# Patient Record
Sex: MALE | Race: BLACK OR AFRICAN AMERICAN | NOT HISPANIC OR LATINO | Employment: FULL TIME | URBAN - METROPOLITAN AREA
[De-identification: names, ages, dates, MRNs, and addresses within clinical notes are randomized per-mention and may not be internally consistent; named-entity substitution may affect disease eponyms.]

---

## 2018-11-27 ENCOUNTER — ALLIED HEALTH/NURSE VISIT (OUTPATIENT)
Dept: NURSING | Facility: CLINIC | Age: 38
End: 2018-11-27

## 2018-11-27 VITALS
SYSTOLIC BLOOD PRESSURE: 105 MMHG | TEMPERATURE: 97.7 F | DIASTOLIC BLOOD PRESSURE: 68 MMHG | WEIGHT: 168.6 LBS | HEART RATE: 82 BPM

## 2018-11-27 DIAGNOSIS — R10.11 RUQ ABDOMINAL PAIN: Primary | ICD-10-CM

## 2018-11-27 ASSESSMENT — PAIN SCALES - GENERAL: PAINLEVEL: WORST PAIN (10)

## 2018-11-27 NOTE — MR AVS SNAPSHOT
"              After Visit Summary   2018    Abel Ashton    MRN: [id number]           Patient Information     Date Of Birth          1980        Visit Information        Provider Department      2018 9:30 AM BK RN [facility]        Today's Diagnoses     RUQ abdominal pain    -  1       Follow-ups after your visit        Who to contact     If you have questions or need follow up information about today's clinic visit or your schedule please contact [facility] directly at [phone].  Normal or non-critical lab and imaging results will be communicated to you by [employer], letter or phone within 4 business days after the clinic has received the results. If you do not hear from us within 7 days, please contact the clinic through [employer] or phone. If you have a critical or abnormal lab result, we will notify you by phone as soon as possible.  Submit refill requests through [employer] or call your pharmacy and they will forward the refill request to us. Please allow 3 business days for your refill to be completed.          Additional Information About Your Visit        MyChart Information     De[employer] lets you send messages to your doctor, view your test results, renew your prescriptions, schedule appointments and more. To sign up, go to [email or web address] . Click on \"Log in\" on the left side of the screen, which will take you to the Welcome page. Then click on \"Sign up Now\" on the right side of the page.     You will be asked to enter the access code listed below, as well as some personal information. Please follow the directions to create your username and password.     Your access code is: [email or web address]  Expires: 2019 10:29 AM     Your access code will  in 90 days. If you need help or a new code, please call your [facility] or [phone].        Care EveryWhere ID     This is your Care EveryWhere ID. This could be used by other " organizations to access your [facility] medical records  [id number]        Your Vitals Were     Pulse Temperature                82 97.7  F (36.5  C) (Oral)           Blood Pressure from Last 3 Encounters:   11/27/18 105/68    Weight from Last 3 Encounters:   11/27/18 168 lb 9.6 oz (76.5 kg)              Today, you had the following     No orders found for display       Primary Care Provider Fax #    Provider Not In System [phone]                Equal Access to Services     MARRY KWAN : Hadii aad ku hadasho Soomaali, waaxda luqadaha, qaybta kaalmada adeegyada, waxay idiin hayaan adeeg gene menjivar . So [facility] [phone].    ATENCIÓN: Si habla español, tiene a moore disposición servicios gratuitos de asistencia lingüística. Llame al [phone].    We comply with applicable federal civil rights laws and [place] laws. We do not discriminate on the basis of race, color, national origin, age, disability, sex, sexual orientation, or gender identity.            Thank you!     Thank you for choosing [facility]  for your care. Our goal is always to provide you with excellent care. Hearing back from our patients is one way we can continue to improve our services. Please take a few minutes to complete the written survey that you may receive in the mail after your visit with us. Thank you!             Your Updated Medication List - Protect others around you: Learn how to safely use, store and throw away your medicines at www.disposemymeds.org.      Notice  As of 11/27/2018 10:29 AM    You have not been prescribed any medications.

## 2018-11-27 NOTE — NURSING NOTE
RN Triage:     Chief Complaint   Patient presents with     Abdominal Pain     RUQ pain, started today, 10/10, no vomiting or diarrhea, no fever       Initial /68 (BP Location: Left arm, Patient Position: Chair, Cuff Size: Adult Regular)  Pulse 82  Temp 97.7  F (36.5  C) (Oral)  Wt 168 lb 9.6 oz (76.5 kg) There is no height or weight on file to calculate BMI.  BP completed using cuff size: regular    Assessment:  Vitals stable, informed patient that he should be seen in the ER due to the possibility of needing testing. Asked if someone was with to drive the patient to the ER. Patient noted that he would call someone. Then patient stated he would drive himself.    Triage Dispo: ER for assessment    Intervention: none    Jihan Chang RN

## 2023-05-13 ENCOUNTER — OFFICE VISIT (OUTPATIENT)
Dept: URGENT CARE | Facility: URGENT CARE | Age: 43
End: 2023-05-13
Payer: COMMERCIAL

## 2023-05-13 VITALS
RESPIRATION RATE: 16 BRPM | HEART RATE: 69 BPM | WEIGHT: 164 LBS | OXYGEN SATURATION: 99 % | SYSTOLIC BLOOD PRESSURE: 107 MMHG | DIASTOLIC BLOOD PRESSURE: 73 MMHG

## 2023-05-13 DIAGNOSIS — K64.4 RESIDUAL HEMORRHOIDAL SKIN TAGS: ICD-10-CM

## 2023-05-13 DIAGNOSIS — N34.2 URETHRITIS: Primary | ICD-10-CM

## 2023-05-13 LAB
ALBUMIN UR-MCNC: NEGATIVE MG/DL
APPEARANCE UR: ABNORMAL
BILIRUB UR QL STRIP: NEGATIVE
COLOR UR AUTO: YELLOW
GLUCOSE UR STRIP-MCNC: NEGATIVE MG/DL
HGB UR QL STRIP: ABNORMAL
KETONES UR STRIP-MCNC: NEGATIVE MG/DL
LEUKOCYTE ESTERASE UR QL STRIP: ABNORMAL
NITRATE UR QL: NEGATIVE
PH UR STRIP: 6 [PH] (ref 5–7)
RBC #/AREA URNS AUTO: ABNORMAL /HPF
SP GR UR STRIP: <=1.005 (ref 1–1.03)
SQUAMOUS #/AREA URNS AUTO: ABNORMAL /LPF
UROBILINOGEN UR STRIP-ACNC: 0.2 E.U./DL
WBC #/AREA URNS AUTO: ABNORMAL /HPF

## 2023-05-13 PROCEDURE — 96372 THER/PROPH/DIAG INJ SC/IM: CPT | Performed by: STUDENT IN AN ORGANIZED HEALTH CARE EDUCATION/TRAINING PROGRAM

## 2023-05-13 PROCEDURE — 81001 URINALYSIS AUTO W/SCOPE: CPT | Performed by: STUDENT IN AN ORGANIZED HEALTH CARE EDUCATION/TRAINING PROGRAM

## 2023-05-13 PROCEDURE — 99204 OFFICE O/P NEW MOD 45 MIN: CPT | Mod: 25 | Performed by: STUDENT IN AN ORGANIZED HEALTH CARE EDUCATION/TRAINING PROGRAM

## 2023-05-13 PROCEDURE — 87086 URINE CULTURE/COLONY COUNT: CPT | Performed by: STUDENT IN AN ORGANIZED HEALTH CARE EDUCATION/TRAINING PROGRAM

## 2023-05-13 PROCEDURE — 87186 SC STD MICRODIL/AGAR DIL: CPT | Performed by: STUDENT IN AN ORGANIZED HEALTH CARE EDUCATION/TRAINING PROGRAM

## 2023-05-13 PROCEDURE — 87491 CHLMYD TRACH DNA AMP PROBE: CPT | Performed by: STUDENT IN AN ORGANIZED HEALTH CARE EDUCATION/TRAINING PROGRAM

## 2023-05-13 PROCEDURE — 87591 N.GONORRHOEAE DNA AMP PROB: CPT | Performed by: STUDENT IN AN ORGANIZED HEALTH CARE EDUCATION/TRAINING PROGRAM

## 2023-05-13 RX ORDER — DOXYCYCLINE 100 MG/1
100 CAPSULE ORAL 2 TIMES DAILY
Qty: 14 CAPSULE | Refills: 0 | Status: SHIPPED | OUTPATIENT
Start: 2023-05-13 | End: 2023-05-14

## 2023-05-13 RX ORDER — CEFTRIAXONE SODIUM 1 G
500 VIAL (EA) INJECTION ONCE
Status: COMPLETED | OUTPATIENT
Start: 2023-05-13 | End: 2023-05-13

## 2023-05-13 RX ADMIN — Medication 500 MG: at 17:05

## 2023-05-13 NOTE — PROGRESS NOTES
"Patient presents with:  Urgent Care  STD: Per patient states he has been having burning sensation in groin area, states he \" knows for a fact that his partner gave him something weather he wants to admit it or not\"       Clinical Decision Making:      ICD-10-CM    1. Urethritis  N34.2 Neisseria gonorrhoeae PCR - Clinic Collect     Chlamydia trachomatis PCR     UA Macroscopic with reflex to Microscopic and Culture     UA Microscopic with Reflex to Culture     Urine Culture     cefTRIAXone (ROCEPHIN) in lidocaine 1% (PF) for IM administration only 500 mg     doxycycline hyclate (VIBRAMYCIN) 100 MG capsule      2. Residual hemorrhoidal skin tags  K64.4       43 yo M here with concern for STI. Exam with mild left inguinal lyphadenopathy, no penile discharge or lesions noted. DDx urethritis secondary to gonorrhea/chlamydia, UTI, prostatitis. UA small LE, trace blood. Gonorrhea/chlaymida pending. Non-tender prostate. Will treat with ceftriaxone IM and doxycycline for 7 days. Recommended absence from intercourse until results are back. Discussed red flags for immediate return to clinic/ER, as well as indications for follow up if no improvement. Patient understood and agreed to plan.     There are no Patient Instructions on file for this visit.      HPI:  Abel Ashton is a 42 year old male who presents today complaining of burning with urination. Noted burning with urination 3-4 days, no penile discharge or rash or discoloration. No swelling in groin. Feels something deep inside when pees or stops urinating. No previous hx of similar thing. Hx of STI, treated. Sexually active, female only, inconsistent condom use. No hematuria, fever, chills, n/v, abdominal pain.   Noted all these symptoms after intercourse after current partner. No rectal pain    History obtained from the patient.    Problem List:  There are no relevant problems documented for this patient.      History reviewed. No pertinent past medical " history.    Social History     Tobacco Use     Smoking status: Every Day     Types: Cigarettes     Smokeless tobacco: Never   Vaping Use     Vaping status: Never Used   Substance Use Topics     Alcohol use: Not on file       Review of Systems    Vitals:    05/13/23 1547   BP: 107/73   Pulse: 69   Resp: 16   SpO2: 99%   Weight: 74.4 kg (164 lb)       Physical Exam  Constitutional:       Appearance: Normal appearance.   Abdominal:      Hernia: A hernia is present. There is no hernia in the left inguinal area or right inguinal area.   Genitourinary:     Penis: Normal and circumcised. No erythema, tenderness, discharge, swelling or lesions.       Testes: Normal. Cremasteric reflex is present.         Right: Tenderness or swelling not present.         Left: Tenderness or swelling not present.      Epididymis:      Right: Normal.      Left: Normal.      Prostate: Not enlarged, not tender and no nodules present.      Rectum: Internal hemorrhoid present. No mass, tenderness, anal fissure or external hemorrhoid. Normal anal tone.   Lymphadenopathy:      Lower Body: No right inguinal adenopathy. Left inguinal adenopathy present.   Neurological:      Mental Status: He is alert.         Labs:  Results for orders placed or performed in visit on 05/13/23   UA Macroscopic with reflex to Microscopic and Culture     Status: Abnormal    Specimen: Urine, Midstream   Result Value Ref Range    Color Urine Yellow Colorless, Straw, Light Yellow, Yellow    Appearance Urine Slightly Cloudy (A) Clear    Glucose Urine Negative Negative mg/dL    Bilirubin Urine Negative Negative    Ketones Urine Negative Negative mg/dL    Specific Gravity Urine <=1.005 1.003 - 1.035    Blood Urine Trace (A) Negative    pH Urine 6.0 5.0 - 7.0    Protein Albumin Urine Negative Negative mg/dL    Urobilinogen Urine 0.2 0.2, 1.0 E.U./dL    Nitrite Urine Negative Negative    Leukocyte Esterase Urine Small (A) Negative   UA Microscopic with Reflex to Culture      Status: Abnormal   Result Value Ref Range    RBC Urine 2-5 (A) 0-2 /HPF /HPF    WBC Urine 10-25 (A) 0-5 /HPF /HPF    Squamous Epithelials Urine Few (A) None Seen /LPF         Lashaun Morris MD

## 2023-05-14 ENCOUNTER — TELEPHONE (OUTPATIENT)
Dept: URGENT CARE | Facility: URGENT CARE | Age: 43
End: 2023-05-14
Payer: COMMERCIAL

## 2023-05-14 DIAGNOSIS — N39.0 URINARY TRACT INFECTION WITH HEMATURIA, SITE UNSPECIFIED: ICD-10-CM

## 2023-05-14 DIAGNOSIS — R31.9 URINARY TRACT INFECTION WITH HEMATURIA, SITE UNSPECIFIED: ICD-10-CM

## 2023-05-14 LAB
C TRACH DNA SPEC QL NAA+PROBE: NEGATIVE
N GONORRHOEA DNA SPEC QL NAA+PROBE: NEGATIVE

## 2023-05-14 RX ORDER — CIPROFLOXACIN 500 MG/1
500 TABLET, FILM COATED ORAL 2 TIMES DAILY
Qty: 14 TABLET | Refills: 0 | Status: SHIPPED | OUTPATIENT
Start: 2023-05-14 | End: 2023-05-21

## 2023-05-14 RX ORDER — DOXYCYCLINE 100 MG/1
100 CAPSULE ORAL 2 TIMES DAILY
Qty: 14 CAPSULE | Refills: 0 | Status: SHIPPED | OUTPATIENT
Start: 2023-05-14 | End: 2023-05-14 | Stop reason: ALTCHOICE

## 2023-05-14 NOTE — TELEPHONE ENCOUNTER
Urine culture with 10-50K E.coli, negative gonorrhea and chlamydia. Patient informed of the results. Will change him from doxycycline to ciprofloxacin 500 mg BID x7 days.

## 2023-05-14 NOTE — TELEPHONE ENCOUNTER
Provider signed script and send it to new pharmacy.  Called patient and informed him provider had signed prescription and send it to the [facility].  Patient will go get myrna Scruggs Lead MA

## 2023-05-14 NOTE — TELEPHONE ENCOUNTER
General Call    Contacts       Type Contact Phone/Fax    05/14/2023 02:15 PM CDT Phone (Incoming) Abel Ashton (Self) [phone] (H)        Reason for Call:  Change  Pharmacy     What are your questions or concerns:  Pt need new script sent to [facility] in [place]. Original pharmacy is closed for the weekend. RX pending    Date of last appointment with provider: 5/13/23    Could we send this information to you in [facility] or would you prefer to receive a phone call?:   Patient would prefer a phone call   Okay to leave a detailed message?: Yes at Cell number on file:    Telephone Information:   Mobile [phone]

## 2023-05-15 LAB — BACTERIA UR CULT: ABNORMAL

## 2023-06-04 ENCOUNTER — HEALTH MAINTENANCE LETTER (OUTPATIENT)
Age: 43
End: 2023-06-04

## 2023-07-27 ENCOUNTER — ANCILLARY PROCEDURE (OUTPATIENT)
Dept: GENERAL RADIOLOGY | Facility: CLINIC | Age: 43
End: 2023-07-27
Attending: EMERGENCY MEDICINE
Payer: COMMERCIAL

## 2023-07-27 ENCOUNTER — OFFICE VISIT (OUTPATIENT)
Dept: URGENT CARE | Facility: URGENT CARE | Age: 43
End: 2023-07-27
Payer: COMMERCIAL

## 2023-07-27 VITALS
TEMPERATURE: 98.7 F | HEART RATE: 86 BPM | DIASTOLIC BLOOD PRESSURE: 86 MMHG | WEIGHT: 171.9 LBS | OXYGEN SATURATION: 100 % | SYSTOLIC BLOOD PRESSURE: 133 MMHG

## 2023-07-27 DIAGNOSIS — M25.461 PAIN AND SWELLING OF KNEE, RIGHT: Primary | ICD-10-CM

## 2023-07-27 DIAGNOSIS — M25.561 PAIN AND SWELLING OF KNEE, RIGHT: ICD-10-CM

## 2023-07-27 DIAGNOSIS — M25.461 PAIN AND SWELLING OF KNEE, RIGHT: ICD-10-CM

## 2023-07-27 DIAGNOSIS — M25.561 PAIN AND SWELLING OF KNEE, RIGHT: Primary | ICD-10-CM

## 2023-07-27 PROCEDURE — 73562 X-RAY EXAM OF KNEE 3: CPT | Mod: TC | Performed by: RADIOLOGY

## 2023-07-27 PROCEDURE — 99214 OFFICE O/P EST MOD 30 MIN: CPT | Performed by: EMERGENCY MEDICINE

## 2023-07-27 NOTE — PROGRESS NOTES
Assessment & Plan     Diagnosis:    ICD-10-CM    1. Pain and swelling of knee, right  M25.561 XR Knee Right 3 Views    M25.461 Orthopedic  Referral     CANCELED: XR Knee Right 3 Views          Medical Decision Making:  Abel Ashton is a 42 year old male who presents with right lateral lower extremity pain proximal to the right knee. Differential diagnosis was broad and included but was not limited to vascular injury, fracture, ligamentous injury, septic arthritis, and dislocation. Trauma was not significant enough to likely cause dislocation and no history of gross deformity.  Neurological exam was intact distally with both ankle and toe movement and sensation intact.  There was   swelling over the knee; patient still had good range of motion of knee limited only by pain. There is no sign of dislocation or fracture; is a moderate knee joint effusion on x-ray on my read; radiology notes as per below. Pulses intact distally without sign of transected vessel and no pulsatile mass in the popliteal fossa.  No pain in the hip or ankle to palpation.  Knee immobilizer not needed. Patent was offered crutches but refused; is ambulatory. Was given an ACE wrap here and went over RICE therapy.   He was advised to followup with orthopedics in one week for likely ligamentous injury.  All questions were answered, will follow up as prescribed.    Diaz Horner PA-C  [facility]    Subjective     Abel Ashton is a 42 year old male who presents to clinic today for the following health issues:  Chief Complaint   Patient presents with    Urgent Care    Knee Problem     Right knee been swelling since last week        HPI  Patient states that for the past week he has had right knee pain and swelling getting progressively worse. He feels it could of started during a game of kick ball when he slipped on the grass and fell, but was not really having much knee or leg pain immediately afterward.   No numbness or weakness in the leg.  No injury to the ankle or hip.  He has been ambulating, somewhat painfully but is able to do so.  He notes he has not tried any interventions for this.      Review of Systems    See HPI    Objective      Vitals: /86 (BP Location: Left arm, Patient Position: Sitting, Cuff Size: Adult Regular)   Pulse 86   Temp 98.7  F (37.1  C) (Tympanic)   Wt 78 kg (171 lb 14.4 oz)   SpO2 100%       Patient Vitals for the past 24 hrs:   BP Temp Temp src Pulse SpO2 Weight   07/27/23 1738 133/86 98.7  F (37.1  C) Tympanic 86 100 % 78 kg (171 lb 14.4 oz)       Vital signs reviewed by: Diaz Horner PA-C    Physical Exam   Constitutional: Patient is alert and cooperative. No acute distress.  Cardiovascular: Regular rate and rhythm. DP/PT pulses 2+ RLE.  Pulmonary/Chest: Lungs are clear to auscultation throughout. Effort normal. No respiratory distress. No wheezes, rales or rhonchi  Neurological: Alert and oriented x3. Strength and sensation are intact and symmetric in the bilateral lower extremities.   MSK/Skin: Right knee with slight swelling compared with left.  Tenderness over the lateral and inferior aspect of the patella, slight pain with varus stressing.  No laxity.  Negative Lachman and Mark signs.  No pulsatile popliteal mass.  Compartments are soft throughout the leg.  No calf swelling.  Negative Homans' sign.  Psychiatric:The patient has a normal mood and affect.     Labs/Imaging:  Results for orders placed or performed in visit on 07/27/23   XR Knee Right 3 Views     Status: None    Narrative    EXAM: XR KNEE RIGHT 3 VIEWS  LOCATION: [facility]  DATE: 7/27/2023    INDICATION:  Pain and swelling of knee, right  COMPARISON: None.      Impression    IMPRESSION: Large knee joint effusion. No acute fracture or malalignment. There is normal joint spacing.     Reading per radiology        Diaz Horner PA-C, July 27, 2023

## 2024-07-14 ENCOUNTER — HEALTH MAINTENANCE LETTER (OUTPATIENT)
Age: 44
End: 2024-07-14

## 2025-07-19 ENCOUNTER — HEALTH MAINTENANCE LETTER (OUTPATIENT)
Age: 45
End: 2025-07-19